# Patient Record
Sex: FEMALE | Race: WHITE | HISPANIC OR LATINO | URBAN - METROPOLITAN AREA
[De-identification: names, ages, dates, MRNs, and addresses within clinical notes are randomized per-mention and may not be internally consistent; named-entity substitution may affect disease eponyms.]

---

## 2024-03-23 ENCOUNTER — EMERGENCY (EMERGENCY)
Facility: HOSPITAL | Age: 49
LOS: 1 days | Discharge: HOME | End: 2024-03-23
Attending: EMERGENCY MEDICINE
Payer: COMMERCIAL

## 2024-03-23 VITALS
TEMPERATURE: 97 F | RESPIRATION RATE: 18 BRPM | OXYGEN SATURATION: 98 % | SYSTOLIC BLOOD PRESSURE: 125 MMHG | DIASTOLIC BLOOD PRESSURE: 82 MMHG | HEIGHT: 66 IN | WEIGHT: 176.37 LBS | HEART RATE: 69 BPM

## 2024-03-23 PROCEDURE — 99283 EMERGENCY DEPT VISIT LOW MDM: CPT

## 2024-03-23 PROCEDURE — 99282 EMERGENCY DEPT VISIT SF MDM: CPT

## 2024-03-23 NOTE — ED PROVIDER NOTE - PATIENT PORTAL LINK FT
You can access the FollowMyHealth Patient Portal offered by [facility] by registering at the following website: [email or web address]. By joining [employer]’s FollowMyHealth portal, you will also be able to view your health information using other applications (apps) compatible with our system.

## 2024-03-23 NOTE — ED PROVIDER NOTE - PHYSICAL EXAMINATION
2.5 cm incision left anterior base of neck, open with dried blood  No erythema, induration or tenderness to palpation  No discharge

## 2024-03-23 NOTE — ED PROVIDER NOTE - CLINICAL SUMMARY MEDICAL DECISION MAKING FREE TEXT BOX
48-year-old female with dehiscence of surgical incision 2 days ago.  Procedure was performed by Dr. Gimenez at [facility] dermatology on [facility].  Attempted to contact practice but it is close.  Plan to approximate wound using Steri-Strips and discussed need for follow-up ASAP with her dermatology group.

## 2024-03-23 NOTE — ED PROVIDER NOTE - OBJECTIVE STATEMENT
48-year-old female presenting with bleeding from incision site to left base of neck.  Patient had removal of "cyst" by dermatologist on Thursday.  States that the procedure was cosmetic.  States that she had stitches that opened up this morning.  She suspects due to stretching.  States she had bleeding which is since resolved.  Denies any pain, fever or redness to area.  Not on any medications

## 2024-03-23 NOTE — ED ADULT TRIAGE NOTE - CHIEF COMPLAINT QUOTE
pt reports that  she is s/p left side neck cyst  removal on  Thursday . last night  she started  bleeding from the  suture site

## 2024-03-23 NOTE — ED PROVIDER NOTE - NSDCPRINTRESULTS_ED_ALL_ED
MANISHM informing patient that an alternative medication was sent to his pharmacy.    Patient requests all Lab, Cardiology, and Radiology Results on their Discharge Instructions

## 2024-03-23 NOTE — ED PROVIDER NOTE - NSFOLLOWUPINSTRUCTIONS_ED_ALL_ED_FT
You were seen because your stitches from a recent procedure came apart.  Your wound is open.  It was cleaned and closed using Steri-Strips.  Is important that you keep the area clean and dry and contact your dermatologist as soon as possible.  Return for any concerns.  See additional instructions.      Wound Dehiscence    WHAT YOU NEED TO KNOW:    What is wound dehiscence? Wound dehiscence is when part or all of a wound comes apart. The wound may come apart if it does not heal completely, or it may heal and then open again. A surgical wound is an example of a wound can that develop dehiscence. Wound dehiscence can become life-threatening.    What are the signs and symptoms of wound dehiscence? Wounds may split open even when they appear to be healing. You may notice the following when your wound starts to come apart:    A feeling that the wound is ripping apart or giving way    Leaking pink or yellow fluid from the wound    Signs of infection at the wound site, such as yellow or green pus, swelling, redness, or warmth  What increases my risk for wound dehiscence?    Wound infection, or blood or fluid under the wound    Diabetes, or liver, kidney, or heart disease    Being overweight or not getting enough nutrition    Smoking    Certain medicines, such as steroids or immune-therapy drugs    Anything that puts pressure on the wound such as coughing or lifting    A weak immune system  How is wound dehiscence diagnosed and treated? Your healthcare provider will know your wound has opened by looking at it. You may need an ultrasound, x-ray, or CT to check for problems deeper in the wound. You may need any of the following to treat wound dehiscence:    Medicines may be needed to treat an infection, help your wound heal, or decrease pain.    Daily wound care includes examining, cleaning, and bandaging your wound. If your wound is left open to heal, you will need to pack your wound with bandages.    A wound vacuum is a device that is placed over your wound. This device helps remove fluid or infection from your wound so it can heal and close.    Splints or binders may be used to decrease stress on your wound and help hold it together.    Surgery may be done to remove infected tissue or close the open wound. Skin grafts, mesh, or stitches may be used to close your wound.  How should I care for my wound?    Wash your hands often. Use soap and water. Wash your hands before and after you touch your wound. This will help to prevent an infection.    Clean your wound as directed. Ask your healthcare provider if it okay to shower or take a bath. Let the soap and water run over your wound. Gently pat the area dry. Look for signs of infection, such as redness, swelling, or pus.    Change your bandages as directed. Replace bandages after you clean the wound or bathe. Change your bandages when they get wet or dirty. If directed, pack your wound. Change the packing as directed.    Do not swim or go in hot tubs until your healthcare provider says it is okay. Hot tubs and pools can cause infection and prevent wound healing.    Wear your binder or splint at all times or as directed. These devices help hold your wound together.    Use devices as directed to help the wound heal. Your healthcare provider will show you how to care for your wound device.  What can I do to promote healing?    Rest as directed. Do not lift anything heavier than 5 pounds. Do not do activities that may put stress on your wound, such as running or sports. Ask your healthcare provider when you can return to your usual activities.    Eat foods high in protein. Protein will help your wound heal. Protein can be found in lean meat, fish, beans, and low-fat dairy. Your healthcare provider may also recommend certain drinks for added protein.    Do not smoke. Nicotine and other chemicals in cigarettes and cigars can prevent your wound from healing. Ask your healthcare provider for information if you currently smoke and need help to quit. E-cigarettes or smokeless tobacco still contain nicotine. Talk to your healthcare provider before you use these products.  When should I seek immediate care?    Your heart is beating faster than usual, or you feel dizzy or lightheaded.    Blood soaks through your bandage.    You see tissue coming through your wound.    You feel like your wound is opening up more.    Your wound oozes yellow or green pus, looks swollen or red, or feels warm.  When should I contact my healthcare provider?    You have a fever or chills.    Your wound leaks fluid or a small amount of blood.    Your pain gets worse or does not get better after you take pain medicine.    You have nausea or are vomiting.    You have questions or concerns about your condition or care.  CARE AGREEMENT:    You have the right to help plan your care. Learn about your health condition and how it may be treated. Discuss treatment options with your healthcare providers to decide what care you want to receive. You always have the right to refuse treatment.    © [employer] 1973, 2024

## 2024-03-23 NOTE — ED ADULT NURSE NOTE - NSFALLUNIVINTERV_ED_ALL_ED
Bed/Stretcher in lowest position, wheels locked, appropriate side rails in place/Call bell, personal items and telephone in reach/Instruct patient to call for assistance before getting out of bed/chair/stretcher/Non-slip footwear applied when patient is off stretcher/Eads to call system/Physically safe environment - no spills, clutter or unnecessary equipment/Purposeful proactive rounding/Room/bathroom lighting operational, light cord in reach